# Patient Record
Sex: MALE | URBAN - METROPOLITAN AREA
[De-identification: names, ages, dates, MRNs, and addresses within clinical notes are randomized per-mention and may not be internally consistent; named-entity substitution may affect disease eponyms.]

---

## 2020-06-08 ENCOUNTER — OFFICE VISIT (OUTPATIENT)
Dept: URBAN - METROPOLITAN AREA TELEHEALTH 2 | Facility: TELEHEALTH | Age: 60
End: 2020-06-08
Payer: COMMERCIAL

## 2020-06-08 ENCOUNTER — LAB OUTSIDE AN ENCOUNTER (OUTPATIENT)
Dept: URBAN - METROPOLITAN AREA TELEHEALTH 2 | Facility: TELEHEALTH | Age: 60
End: 2020-06-08

## 2020-06-08 DIAGNOSIS — K76.0 FATTY LIVER: ICD-10-CM

## 2020-06-08 DIAGNOSIS — R74.8 ABNORMAL LIVER ENZYMES: ICD-10-CM

## 2020-06-08 DIAGNOSIS — R16.0 HEPATOMEGALY: ICD-10-CM

## 2020-06-08 DIAGNOSIS — I10 HYPERTENSION: ICD-10-CM

## 2020-06-08 DIAGNOSIS — K20.9 ESOPHAGITIS: ICD-10-CM

## 2020-06-08 DIAGNOSIS — R79.89 ELEVATED FERRITIN: ICD-10-CM

## 2020-06-08 DIAGNOSIS — K74.60 CIRRHOSIS OF LIVER WITHOUT ASCITES, UNSPECIFIED HEPATIC CIRRHOSIS TYPE: ICD-10-CM

## 2020-06-08 DIAGNOSIS — E11.9 DIABETES MELLITUS: ICD-10-CM

## 2020-06-08 DIAGNOSIS — R16.1 SPLENOMEGALY: ICD-10-CM

## 2020-06-08 DIAGNOSIS — R93.5 ABNORMAL MRI OF ABDOMEN: ICD-10-CM

## 2020-06-08 DIAGNOSIS — Z79.899 HIGH RISK MEDICATION USE: ICD-10-CM

## 2020-06-08 DIAGNOSIS — K76.6 PORTAL HYPERTENSION: ICD-10-CM

## 2020-06-08 DIAGNOSIS — Z14.8 CARRIER OF ALPHA-1-ANTITRYPSIN DEFICIENCY: ICD-10-CM

## 2020-06-08 DIAGNOSIS — E83.110 HEMOCHROMATOSIS ASSOCIATED WITH MUTATION IN HFE GENE: ICD-10-CM

## 2020-06-08 PROCEDURE — G8417 CALC BMI ABV UP PARAM F/U: HCPCS

## 2020-06-08 PROCEDURE — G8427 DOCREV CUR MEDS BY ELIG CLIN: HCPCS

## 2020-06-08 PROCEDURE — 3017F COLORECTAL CA SCREEN DOC REV: CPT

## 2020-06-08 PROCEDURE — 99214 OFFICE O/P EST MOD 30 MIN: CPT

## 2020-06-08 PROCEDURE — G9903 PT SCRN TBCO ID AS NON USER: HCPCS

## 2020-06-08 RX ORDER — LOSARTAN POTASSIUM 100 MG/1
TAKE 1 TABLET (100 MG) BY ORAL ROUTE ONCE DAILY TABLET, FILM COATED ORAL 1
Qty: 0 | Refills: 0 | Status: ACTIVE | COMMUNITY
Start: 1900-01-01

## 2020-06-08 RX ORDER — ASPIRIN 81 MG/1
CHEW 1 TABLET (81 MG) BY ORAL ROUTE ONCE DAILY TABLET, CHEWABLE ORAL 1
Qty: 0 | Refills: 0 | Status: ACTIVE | COMMUNITY
Start: 1900-01-01

## 2020-06-08 RX ORDER — PANTOPRAZOLE SODIUM 40 MG
TAKE 1 TABLET (40 MG) BY ORAL ROUTE ONCE DAILY FOR 90 DAYS TABLET, DELAYED RELEASE (ENTERIC COATED) ORAL 1
Qty: 90 | Refills: 1 | Status: ACTIVE | COMMUNITY
Start: 2020-03-10 | End: 2020-09-06

## 2020-06-08 NOTE — HPI-TODAY'S VISIT:
Patient seen today via telehealth by agreement and consent of patient in light of current COVID-19 pandemic. I used video conferencing during the visit. The patient encounter is appropriate and reasonable under the circumstances given the patient's particular presentation at this time. The patient has been advised of the followin) the potential risks and limitations of this mode of treatment (including but not limited to the absence of in-person examination); 2) the right to refuse telehealth services at any point without affecting the right to future care; 3) the right to receive in-person services, included immediately after this consultation if an urgent need arises; 4) information, including identifiable images or information from this telehealth consult, will only be shared in accordance with HIPAA regulations. Any and all of the patient's and/or patient's family member's questions on this issue have been answered. The patient has verbally consented to be treated via telehealth services. The patient has also been advised to contact this office for worsening conditions or problems, and seek emergency medical treatment and/or call 911 if the patient deems either necessary. Attempts were made to use real-time two-way video technology for today's encounter. Due to a technological failure or access issues, telephone technology was used in lieu of an office visit due to the current COVID-19 pandemic crisis and need for social isolation.

## 2020-06-08 NOTE — EXAM-PHYSICAL EXAM
Telemed:  Gen: no distress. Eyes: no jaundice. Mouth: no thrush. CV: no chest pain. Resp: no wheezes. Abd: no pain. Ext: no edema. Neuro: no weakness.

## 2020-06-08 NOTE — HPI-OTHER HISTORIES
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 A copy of this document will be sent to the referring provider.   The patient reports a personal history of no other habits that could cause liver damage.   Chart review and pt interview:   Pt is doing the telemed:  He leaves in July and he says he will be going to [place]. We know people to refer to in most areas there and so he will let me know who is on plan and where they end up living to see closest one.   May 13 2020 u/s echogenic and suggesting hepatic steatosis. Mild splenomegaly 15.5cm. Normal liver doppler. No stones in gb. No focal abnormality.  right kidney 13.1cm and no hydronephrosis.  Needs to mri to look at this.   May 13 2020 and wbc 5.4 and hg 15.8 and plat 97 and glu 121 and cr 1.05 and na 142 and k 4.1 and cl 104 and co2 25 and alb 4.5 and tb 0.9 and alk 116 and ats 27 and alt 34 and inr 1.2. meld 9  and meld na 9.  2-25-20 egd distal esophageal barretts esophagus noted which is 3cm long with islands of barretts and bx done. No esophageal varices and no stricture and gastritis of the antrum. Dr Yoo is seeing for this locally.  He started him Pantoprazole 40mg po qd.  Hepatitis a and b vaccine series started. He is hepatitis A immune but he did labs 2-20 so the hep a immune that we see could be false positive as done post the vaccine.  2-18 and 3-26. 2rd one is aug or sept 2020 and says last one remains and he plans to do in [place].  2-20-20 wbc 5.8 hg 15.2 plat 100 and glu 126 and cr 0.89 and na 139 and k 4.1 and cl 105 and co2 21 and ca 9.8 and alb 4.8 and tb 0.8 and alk 123 ast 32 and alt 49 and iron 29% and inr 1.2 and hep b not immune 6.8. hfe single gene h63d seen. alpha one 108 (prior MS)  and ferritin 415 and hep a immune (but he had the vaccine).  Meld 8 and Meld na 8 so appears early disease.   No varices on the egd also good to note and usually redone in 1-2 yrs but due Barretts he will be getting looked.  Pt prior stated that told when in [place] he has had a history of fatty liver and then moved here and is headed back to [place]. He says wife has a contract and will leave July 31.  We will try to connect with one of our doctors there and he is to insurance of his wife when there.   Prior to this u/s no prior scan.  Aug 2019 b core igm and b sag and c ab and a igm all neg. chol 173 and trg 121 and hdl 43.  oct 2019 asma <20 and iron 29% and ferritin 401 and alpha one MS and ace 80.   He has ancestry [place] and [place].  He says brother has fatty liver and other. Stressed family to be checked for this genetic disorder.  Prior he drank socially/light once or so on weekends. He says stopped well before when told re this Oct 2019.  Oct 2019 u.s with liver enlarged 21.66 cm and diffusely inc echoegenicity and gb no stones and cbd 3mm and pancreas obscured.  He will do the mri now at [facility] and insurance and job ends July 31.  Oct 2019 mri chronic liver disease and cirrhosis with portal htn and no ascites and no hepatoma. Spleen 19cm and pancreas atrophic and left lateral segment and caudate hypertrophy.  We need to compare to this.  He lives near [facility] in [facility].  Prior gave info re liu and alpha one. He doues have 2 kids and 1/2 checked and neg and other has not as pregnant.  Alpha one ms means at some point also needs pulm Dr Boswell.   Due to covid 19 this may be delayed.  Needed to work on weight and says 250s to 230s to 229 and says about the same now and needs to keep to working on it. The best thing is to work and that will with the liu and healthy diet.  We can't change genetics but can work on weight.  Dm is on pills and his a1c and asked re a1c and has not been rechecked for this.  PLAN: get mri now ahi as suspect cirrhosis and u.s not showing and need to be sure and do labs in july and see pre leaving for [place].  Stressed to pt the need for social distancing and strict handwashing and wearing a mask and to follow any other new or added CDC recommendations as this is an evolving target.  Duration of the visit was 25 minutes (he had computer issues and even cell phone issues and had to call him x 4 to do the visit with calls 2,10,3, and 10 min despite my landline to his cell phone) with greater than 50% of time spent reviewing chart and events with the pt and in discussing liu and alpha one ms and his issues.

## 2020-06-15 ENCOUNTER — TELEPHONE ENCOUNTER (OUTPATIENT)
Dept: URBAN - METROPOLITAN AREA CLINIC 92 | Facility: CLINIC | Age: 60
End: 2020-06-15

## 2020-06-16 ENCOUNTER — LAB OUTSIDE AN ENCOUNTER (OUTPATIENT)
Dept: URBAN - METROPOLITAN AREA CLINIC 86 | Facility: CLINIC | Age: 60
End: 2020-06-16

## 2020-06-18 ENCOUNTER — TELEPHONE ENCOUNTER (OUTPATIENT)
Dept: URBAN - METROPOLITAN AREA CLINIC 92 | Facility: CLINIC | Age: 60
End: 2020-06-18

## 2020-06-19 LAB
A/G RATIO: 1.6
ALBUMIN: 4.7
ALKALINE PHOSPHATASE: 117
ALT (SGPT): 36
AST (SGOT): 24
BASO (ABSOLUTE): 0.1
BASOS: 2
BILIRUBIN, TOTAL: 1.1
BUN/CREATININE RATIO: 14
BUN: 13
CALCIUM: 9.9
CARBON DIOXIDE, TOTAL: 22
CHLORIDE: 101
CREATININE: 0.96
EGFR IF AFRICN AM: 100
EGFR IF NONAFRICN AM: 87
EOS (ABSOLUTE): 0.1
EOS: 1
GLOBULIN, TOTAL: 3
GLUCOSE: 110
HEMATOCRIT: 44.5
HEMATOLOGY COMMENTS:: (no result)
HEMOGLOBIN: 15.5
IMMATURE CELLS: (no result)
IMMATURE GRANS (ABS): (no result)
IMMATURE GRANULOCYTES: (no result)
IRON BIND.CAP.(TIBC): 318
IRON SATURATION: 46
IRON: 146
LYMPHS (ABSOLUTE): 2
LYMPHS: 38
MCH: 28.4
MCHC: 34.8
MCV: 82
MONOCYTES(ABSOLUTE): 0.4
MONOCYTES: 7
NEUTROPHILS (ABSOLUTE): 2.7
NEUTROPHILS: 52
NRBC: (no result)
PLATELETS: 95
POTASSIUM: 3.9
PROTEIN, TOTAL: 7.7
RBC: 5.45
RDW: 15.1
SODIUM: 138
UIBC: 172
WBC: 5.2

## 2020-07-06 ENCOUNTER — LAB OUTSIDE AN ENCOUNTER (OUTPATIENT)
Dept: URBAN - METROPOLITAN AREA TELEHEALTH 2 | Facility: TELEHEALTH | Age: 60
End: 2020-07-06

## 2020-07-11 ENCOUNTER — TELEPHONE ENCOUNTER (OUTPATIENT)
Dept: URBAN - METROPOLITAN AREA CLINIC 92 | Facility: CLINIC | Age: 60
End: 2020-07-11

## 2020-07-11 NOTE — HPI-OTHER HISTORIES
Dear Ky,  July 2020 mri: cirrhotic liver. No significant liver fat seen. No suspicious liver lesions. Spleen enlarged.  Small abdominal collaterals seen. They feel this could be due to some portal hypertension that is forming. Mild degenerative changes in the lumbar spine. Probable tiny esophageal varices near the esophagus. They also suspect some tiny kidney cysts are also present (too small to be sure). No filling defects in gallbladder. Mild fatty replacement of the pancreas seen. Liver fat normal 3%, 4mm tiny cyst seen on liver. So this mri agrees and suggests at cirrhosis with early pressure  build up so need to work to help risk factors for liver to get better. Would be good to get elective egd done to look for varices when can either here or down in [place] as I know your move is imminent. Also need to redo mri 6m and keep disc copy so that you can use to do comparison down in [place].  Dr Lynn

## 2020-07-20 ENCOUNTER — OFFICE VISIT (OUTPATIENT)
Dept: URBAN - METROPOLITAN AREA TELEHEALTH 2 | Facility: TELEHEALTH | Age: 60
End: 2020-07-20
Payer: COMMERCIAL

## 2020-07-20 ENCOUNTER — DASHBOARD ENCOUNTERS (OUTPATIENT)
Age: 60
End: 2020-07-20

## 2020-07-20 DIAGNOSIS — K76.6 PORTAL HYPERTENSION: ICD-10-CM

## 2020-07-20 DIAGNOSIS — K74.69 CIRRHOSIS, CRYPTOGENIC: ICD-10-CM

## 2020-07-20 DIAGNOSIS — R16.1 SPLENOMEGALY: ICD-10-CM

## 2020-07-20 DIAGNOSIS — Z79.899 HIGH RISK MEDICATION USE: ICD-10-CM

## 2020-07-20 PROBLEM — 19943007: Status: ACTIVE | Noted: 2020-06-08

## 2020-07-20 PROBLEM — 399187006: Status: ACTIVE | Noted: 2020-06-08

## 2020-07-20 PROBLEM — 47461006: Status: ACTIVE | Noted: 2020-06-06

## 2020-07-20 PROCEDURE — G8417 CALC BMI ABV UP PARAM F/U: HCPCS

## 2020-07-20 PROCEDURE — 1036F TOBACCO NON-USER: CPT

## 2020-07-20 PROCEDURE — 99214 OFFICE O/P EST MOD 30 MIN: CPT

## 2020-07-20 PROCEDURE — G9903 PT SCRN TBCO ID AS NON USER: HCPCS

## 2020-07-20 RX ORDER — ASPIRIN 81 MG/1
CHEW 1 TABLET (81 MG) BY ORAL ROUTE ONCE DAILY TABLET, CHEWABLE ORAL 1
Qty: 0 | Refills: 0 | Status: ACTIVE | COMMUNITY
Start: 1900-01-01

## 2020-07-20 RX ORDER — LOSARTAN POTASSIUM 100 MG/1
TAKE 1 TABLET (100 MG) BY ORAL ROUTE ONCE DAILY TABLET, FILM COATED ORAL 1
Qty: 0 | Refills: 0 | Status: ACTIVE | COMMUNITY
Start: 1900-01-01

## 2020-07-20 RX ORDER — PANTOPRAZOLE SODIUM 40 MG
TAKE 1 TABLET (40 MG) BY ORAL ROUTE ONCE DAILY FOR 90 DAYS TABLET, DELAYED RELEASE (ENTERIC COATED) ORAL 1
Qty: 90 | Refills: 1 | Status: ACTIVE | COMMUNITY
Start: 2020-03-10 | End: 2020-09-06

## 2020-07-20 NOTE — HPI-OTHER HISTORIES
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   A copy of this document will be sent to the referring provider.   The patient reports a personal history of no other habits that could cause liver damage.   Chart review and pt interview:   Pt is doing the telemed:  2020 mri: cirrhotic liver. No significant liver fat seen. No suspicious liver lesions. Spleen enlarged.  Small abdominal collaterals seen. They feel this could be due to some portal hypertension that is forming. Mild degenerative changes in the lumbar spine. Probable tiny esophageal varices near the esophagus. They also suspect some tiny kidney cysts are also present (too small to be sure). No filling defects in gallbladder. Mild fatty replacement of the pancreas seen. Liver fat normal 3%, 4mm tiny cyst seen on liver. So this mri agrees and suggests at cirrhosis with early pressure  build up so need to work to help risk factors for liver to get better.  He was advsied that he need to get in when moves to [place] again with local hepatologist and to also remind him to  look for varices in 2021 again.  He is moving down to [facility] and in 2 weeks and he is looking to [facility].  He is to be in [place]:  Austin Dc MD, [facility]  in [place], [place] near [place].  Ivana will send him the info.  Also need to redo mri 6m and he was reminded to keep disc copy so that you can use to do comparison down in [place].  2020 iron 46%  ca 9.9 and glu 110 elevated and bun 13 and tp 7.7 and alb 4.7 and tb 1.1 alk 117 and ast 24 and k 3.9 and na 138 and cl 101 and cr 0.96. alt 36 and ideal  less than 35.  wbc 5.2 hg 15.5 and plat 95.  May 13 2020 u/s echogenic and suggesting hepatic steatosis. Mild splenomegaly 15.5cm. Normal liver doppler. No stones in gb. No focal abnormality.  right kidney 13.1cm and no hydronephrosis.  May 13 2020 and wbc 5.4 and hg 15.8 and plat 97 and glu 121 and cr 1.05 and na 142 and k 4.1 and cl 104 and co2 25 and alb 4.5 and tb 0.9 and alk 116 and ats 27 and alt 34 and inr 1.2. meld 9  and meld na 9.  2--20 egd distal esophageal barretts esophagus noted which is 3cm long with islands of barretts and bx done. No esophageal varices and no stricture and gastritis of the antrum. Dr Yoo was seeing for this locally.  He started him Pantoprazole 40mg po qd.  Hepatitis a and b vaccine series started. He is hepatitis A immune but he did labs 2- so the hep a immune that we see could be false positive as done post the vaccine.  2- and 3-. 2rd one is aug or 2020 and says last one remains and he plans to do in [place].  - wbc 5.8 hg 15.2 plat 100 and glu 126 and cr 0.89 and na 139 and k 4.1 and cl 105 and co2 21 and ca 9.8 and alb 4.8 and tb 0.8 and alk 123 ast 32 and alt 49 and iron 29% and inr 1.2 and hep b not immune 6.8. hfe single gene h63d seen. alpha one 108 (prior MS)  and ferritin 415 and hep a immune (but he had the vaccine).  Meld 8 and Meld na 8 so appears early disease.  No varices on the last egd also good to note and usually redone in 1-2 yrs but due Barretts he will be getting looked.  Pt prior stated that told when in [place] he has had a history of fatty liver and then moved here and is headed back to [place]. He says wife has a contract and so he will leave .  Prior to this u/s no prior scan.  Aug 2019 b core igm and b sag and c ab and a igm all neg. chol 173 and trg 121 and hdl 43.  oct 2019 asma <20 and iron 29% and ferritin 401 and alpha one MS and ace 80.   He has ancestry [place] and [place].  He says brother has fatty liver and other. Stressed family to be checked for this genetic disorder.  Prior he drank socially/light once or so on weekends. He says stopped well before when told re this Oct 2019.  Oct 2019 u.s with liver enlarged 21.66 cm and diffusely inc echogenicity and gb no stones and cbd 3mm and pancreas obscured.  Oct 2019 mri chronic liver disease and cirrhosis with portal htn and no ascites and no hepatoma. Spleen 19cm and pancreas atrophic and left lateral segment and caudate hypertrophy.  He lived near [facility] in [facility].  Prior gave info re liu and alpha one. He does have 2 kids and 1/2 checked and neg and other has not as pregnant.  Alpha one ms means at some point also needs pulm Dr Boswell if here or down there.  Needed to work on weight and says 250s to 230s to 229 and latest is 227 and so doing well.  Stressed that he needs to keep to working on it. The best thing is to work and that will with the liu and healthy diet.  We can't change his genetics but can work on weight and if iron up can do a treatment.  Dm is on pills and his a1c and asked re a1c and has not been rechecked for this.  Says he was told at work to check for covid 19 and was told by work to be checked for this. He will follow up on this. Stressed if positive an issue is cirrhotic and has higher risk with this. Also moving to near a hot spot in [place].  PLAN:  1. He can go to the [facility] or Dr Dc in [place]. 2. Reminded mri in 6m. 3. Egd in 2021. 4. Finish vaccines in Sept, 5. Take records and disc to appt. 6. Ivana can send info also.   Stressed to pt the need for social distancing and strict handwashing and wearing a mask and to follow any other new or added CDC recommendations as this is an evolving target.   Duration of the visit was 27 minutes with greater than 50% of time spent reviewing chart and events with the pt and in discussing liu and alpha one ms and hfe his issues.   Attempts were made to use real-time two-way video technology for today's encounter. Due to a technological failure or access issues, telephone technology was used in lieu of an office visit due to the current COVID-19 pandemic crisis and need for social isolation.  Patient seen today via telehealth by agreement and consent of patient in light of current COVID-19 pandemic. I used video conferencing during the visit. The patient encounter is appropriate and reasonable under the circumstances given the patient's particular presentation at this time. The patient has been advised of the followin) the potential risks and limitations of this mode of treatment (including but not limited to the absence of in-person examination); 2) the right to refuse telehealth services at any point without affecting the right to future care; 3) the right to receive in-person services, included immediately after this consultation if an urgent need arises; 4) information, including identifiable images or information from this telehealth consult, will only be shared in accordance with HIPAA regulations. Any and all of the patient's and/or patient's family member's questions on this issue have been answered. The patient has verbally consented to be treated via telehealth services. The patient has also been advised to contact this office for worsening conditions or problems, and seek emergency medical treatment and/or call 911 if the patient deems either necessary.

## 2020-09-29 ENCOUNTER — OFFICE VISIT (OUTPATIENT)
Dept: URBAN - METROPOLITAN AREA CLINIC 114 | Facility: CLINIC | Age: 60
End: 2020-09-29